# Patient Record
Sex: FEMALE | Race: BLACK OR AFRICAN AMERICAN | NOT HISPANIC OR LATINO | Employment: FULL TIME | URBAN - METROPOLITAN AREA
[De-identification: names, ages, dates, MRNs, and addresses within clinical notes are randomized per-mention and may not be internally consistent; named-entity substitution may affect disease eponyms.]

---

## 2023-05-12 ENCOUNTER — APPOINTMENT (OUTPATIENT)
Dept: LAB | Facility: LAB | Age: 43
End: 2023-05-12
Payer: MEDICAID

## 2023-12-18 ENCOUNTER — APPOINTMENT (OUTPATIENT)
Dept: RADIOLOGY | Facility: HOSPITAL | Age: 43
End: 2023-12-18
Payer: COMMERCIAL

## 2023-12-18 ENCOUNTER — HOSPITAL ENCOUNTER (EMERGENCY)
Facility: HOSPITAL | Age: 43
Discharge: HOME | End: 2023-12-18
Attending: EMERGENCY MEDICINE
Payer: COMMERCIAL

## 2023-12-18 VITALS
OXYGEN SATURATION: 98 % | HEART RATE: 94 BPM | HEIGHT: 66 IN | TEMPERATURE: 97.5 F | BODY MASS INDEX: 39.37 KG/M2 | SYSTOLIC BLOOD PRESSURE: 134 MMHG | WEIGHT: 245 LBS | DIASTOLIC BLOOD PRESSURE: 87 MMHG | RESPIRATION RATE: 20 BRPM

## 2023-12-18 DIAGNOSIS — S82.839A AVULSION FRACTURE OF DISTAL FIBULA: Primary | ICD-10-CM

## 2023-12-18 PROCEDURE — 73610 X-RAY EXAM OF ANKLE: CPT | Mod: RIGHT SIDE | Performed by: RADIOLOGY

## 2023-12-18 PROCEDURE — 99284 EMERGENCY DEPT VISIT MOD MDM: CPT | Performed by: EMERGENCY MEDICINE

## 2023-12-18 PROCEDURE — 73110 X-RAY EXAM OF WRIST: CPT | Mod: RIGHT SIDE | Performed by: RADIOLOGY

## 2023-12-18 PROCEDURE — 73630 X-RAY EXAM OF FOOT: CPT | Mod: RT

## 2023-12-18 PROCEDURE — 73564 X-RAY EXAM KNEE 4 OR MORE: CPT | Mod: 50

## 2023-12-18 PROCEDURE — 2500000001 HC RX 250 WO HCPCS SELF ADMINISTERED DRUGS (ALT 637 FOR MEDICARE OP): Mod: SE

## 2023-12-18 PROCEDURE — 73502 X-RAY EXAM HIP UNI 2-3 VIEWS: CPT | Mod: RT

## 2023-12-18 PROCEDURE — 73610 X-RAY EXAM OF ANKLE: CPT | Mod: RT,FR

## 2023-12-18 PROCEDURE — 73502 X-RAY EXAM HIP UNI 2-3 VIEWS: CPT | Mod: RIGHT SIDE | Performed by: RADIOLOGY

## 2023-12-18 PROCEDURE — 73564 X-RAY EXAM KNEE 4 OR MORE: CPT | Mod: BILATERAL PROCEDURE | Performed by: RADIOLOGY

## 2023-12-18 PROCEDURE — 73630 X-RAY EXAM OF FOOT: CPT | Mod: RIGHT SIDE | Performed by: RADIOLOGY

## 2023-12-18 PROCEDURE — 73110 X-RAY EXAM OF WRIST: CPT | Mod: RT,FY,FR

## 2023-12-18 RX ORDER — IBUPROFEN 600 MG/1
600 TABLET ORAL ONCE
Status: COMPLETED | OUTPATIENT
Start: 2023-12-18 | End: 2023-12-18

## 2023-12-18 RX ADMIN — IBUPROFEN 600 MG: 600 TABLET, FILM COATED ORAL at 16:35

## 2023-12-18 ASSESSMENT — PAIN - FUNCTIONAL ASSESSMENT
PAIN_FUNCTIONAL_ASSESSMENT: 0-10
PAIN_FUNCTIONAL_ASSESSMENT: 0-10

## 2023-12-18 ASSESSMENT — PAIN SCALES - GENERAL
PAINLEVEL_OUTOF10: 9
PAINLEVEL_OUTOF10: 0 - NO PAIN

## 2023-12-18 ASSESSMENT — COLUMBIA-SUICIDE SEVERITY RATING SCALE - C-SSRS
6. HAVE YOU EVER DONE ANYTHING, STARTED TO DO ANYTHING, OR PREPARED TO DO ANYTHING TO END YOUR LIFE?: NO
1. IN THE PAST MONTH, HAVE YOU WISHED YOU WERE DEAD OR WISHED YOU COULD GO TO SLEEP AND NOT WAKE UP?: NO
2. HAVE YOU ACTUALLY HAD ANY THOUGHTS OF KILLING YOURSELF?: NO

## 2023-12-18 ASSESSMENT — PAIN DESCRIPTION - ORIENTATION: ORIENTATION: RIGHT

## 2023-12-18 ASSESSMENT — PAIN DESCRIPTION - PAIN TYPE: TYPE: ACUTE PAIN

## 2023-12-18 ASSESSMENT — PAIN DESCRIPTION - LOCATION: LOCATION: ANKLE

## 2023-12-18 ASSESSMENT — LIFESTYLE VARIABLES
EVER FELT BAD OR GUILTY ABOUT YOUR DRINKING: NO
HAVE YOU EVER FELT YOU SHOULD CUT DOWN ON YOUR DRINKING: NO
HAVE PEOPLE ANNOYED YOU BY CRITICIZING YOUR DRINKING: NO
EVER HAD A DRINK FIRST THING IN THE MORNING TO STEADY YOUR NERVES TO GET RID OF A HANGOVER: NO
REASON UNABLE TO ASSESS: NO

## 2023-12-18 NOTE — ED PROVIDER NOTES
CC: Fall     HPI:  This patient is a 43-year-old female who presents emergency department after a fall.  Patient states that she was walking outside over a median in between parking spaces that was wet from the rain and muddy.  She ended up slipping as she went to step down off of the median.  She fell forward landing on both of her knees and caught the rest of her fall with her right hand.  She tried to tough it out throughout the day however has had a lot of pain in her right ankle and has been unable to bear a ton of weight.  She did wrap it with Coban and at work with little relief.  She is complaining of pain in both of her knees with weightbearing and ambulation.  She also has pain in her right hip.  She complained of pain in her right wrist upon lifting objects.  She is right-handed.  She did not take any medications because she has a history of migraines and was fearful of the rebound headaches.  She presents to be checked out for broken bones.  No other symptoms at this time.  Patient does have a history of prior right foot surgeries including bunionectomy and shortening of the second toe.    Limitations to history: None  Independent historian(s): Patient  Records Reviewed: Recent available ED and inpatient notes reviewed in EMR.    PMHx/PSHx:  Per HPI.   - has no past medical history on file.  - has a past surgical history that includes Tubal ligation (2015);  section, classic (2015); Hysteroscopy (2016); and Other surgical history (2021).    Medications:  Reviewed in EMR. See EMR for complete list of medications and doses.    Allergies:  Carisoprodol and Sulfa (sulfonamide antibiotics)    Social History:  - Tobacco:  has no history on file for tobacco use.   - Alcohol:  has no history on file for alcohol use.   - Illicit Drugs:  has no history on file for drug use.     ROS:  Per HPI.       ???????????????????????????????????????????????????????????????  Triage Vitals:  T  36.4 °C (97.5 °F)  HR 96  BP (!) 137/91  RR 16  O2 100 %      Physical Exam    General: Patient resting comfortably, no acute distress, overall well appearing, and appropriately conversational.   Head: Normocephalic. Atraumatic.  Neck: FROM. No gross masses.   Eyes: EOMI. Conjunctiva clear.   ENT: Moist mucous membranes, no apparent trauma or lesions.  CV: Regular rate. 2+ radial pulses bilaterally.  Resp: No respiratory distress, breathing easily. Speaks in full sentences.    GI: Soft, non-distended. No tenderness with palpation.   MSK: Tenderness with palpation of the medial ankle just below the ankle as well as medial midfoot.  Good range of motion in the right ankle.  Tenderness in bilateral patella and area just distal to patella.  Full range of motion with some tenderness.  No tenderness to palpation of the right hip however pain with range of motion.  No snuffbox tenderness of the right wrist.  Good range of motion without significant tenderness.  EXT: No peripheral edema, contusions, or wounds.   Skin: Warm and dry, no rashes or lesions.  Neuro: Alert. No focal neurological deficits. Motor and sensation intact throughout. Speech fluent. Normal gait.   Psych: Appropriate mood and behavior, converses and responds appropriately.    ???????????????????????????????????????????????????????????????  EKG:  None    MDM:  This patient is a 43-year-old female who presents emergency department after a fall.  Patient has pain in multiple joints and regions.  Blood pressure mildly elevated 137/91 however other vitals are normal.  There is no snuffbox tenderness in the right wrist and my concern for scaphoid fracture is low based on this and her physical exam.  Will obtain x-ray imaging of her right wrist, right hip and pelvis, bilateral knees, and right ankle and foot checking for any fractures.  Overall my concern for fractures in the hip and knees is quite low based on her physical exam.  Patient did wish to receive  some pain medication and requested Motrin.  She denies being pregnant.    X-ray imaging revealed indication of an avulsion fracture of the right distal fibula.  X-ray of the right hand showing possible subluxation at the basal joint of the right thumb.  Patient states she started having some pain when they are manipulating her hand for the x-rays.  All other x-rays were negative for fractures or acute traumatic injuries.  Patient will be provided with a walking boot and crutches for the fracture.  We will also give her a soft wrist brace given the findings in the right wrist.  I recommended follow-up with orthopedic surgery in 1 week.  Information provided for orthopedic walk-in clinics as well.  She expressed understanding and agreed with this plan.  She remained hemodynamically stable and will be discharged home.    ED Course:  ED Course as of 12/18/23 1940   Mon Dec 18, 2023   1857 Right distal fibula avulsion fracture. Subluxation of the basal joint of right thumb, likely chronic.  [VM]      ED Course User Index  [VM] Marco A Quintero DO         Diagnoses as of 12/18/23 1940   Avulsion fracture of distal fibula       Social Determinants Limiting Care:  None identified    Disposition:  Discharged       Marco A Quintero DO  Emergency Medicine PGY-2  [facility]      Procedures ? SmartLinks last updated 12/18/2023 6:58 PM        Marco A Quintero DO  Resident  12/18/23 1941

## 2023-12-19 ENCOUNTER — OFFICE VISIT (OUTPATIENT)
Dept: ORTHOPEDIC SURGERY | Facility: HOSPITAL | Age: 43
End: 2023-12-19
Payer: COMMERCIAL

## 2023-12-19 DIAGNOSIS — M21.6X1 ACQUIRED PRONATION DEFORMITY OF ANKLE, RIGHT: ICD-10-CM

## 2023-12-19 DIAGNOSIS — M76.821 POSTERIOR TIBIAL TENDON DYSFUNCTION (PTTD) OF RIGHT LOWER EXTREMITY: ICD-10-CM

## 2023-12-19 DIAGNOSIS — S82.51XA AVULSION FRACTURE OF MEDIAL MALLEOLUS OF RIGHT TIBIA, CLOSED, INITIAL ENCOUNTER: Primary | ICD-10-CM

## 2023-12-19 DIAGNOSIS — M67.01 CONTRACTURE OF RIGHT ACHILLES TENDON: ICD-10-CM

## 2023-12-19 DIAGNOSIS — M21.41 ACQUIRED PES PLANUS, RIGHT: ICD-10-CM

## 2023-12-19 PROCEDURE — G0463 HOSPITAL OUTPT CLINIC VISIT: HCPCS | Performed by: STUDENT IN AN ORGANIZED HEALTH CARE EDUCATION/TRAINING PROGRAM

## 2023-12-19 PROCEDURE — 99213 OFFICE O/P EST LOW 20 MIN: CPT | Performed by: STUDENT IN AN ORGANIZED HEALTH CARE EDUCATION/TRAINING PROGRAM

## 2023-12-19 PROCEDURE — 99203 OFFICE O/P NEW LOW 30 MIN: CPT | Performed by: STUDENT IN AN ORGANIZED HEALTH CARE EDUCATION/TRAINING PROGRAM

## 2023-12-19 RX ORDER — VALSARTAN 160 MG/1
160 TABLET ORAL
COMMUNITY
Start: 2023-12-07

## 2023-12-19 RX ORDER — IBUPROFEN 600 MG/1
800 TABLET ORAL EVERY 6 HOURS
COMMUNITY
Start: 2013-09-26

## 2023-12-19 RX ORDER — ATORVASTATIN CALCIUM 20 MG/1
20 TABLET, FILM COATED ORAL DAILY
COMMUNITY

## 2023-12-19 RX ORDER — FAMOTIDINE 20 MG/1
20 TABLET, FILM COATED ORAL 2 TIMES DAILY
COMMUNITY
Start: 2019-01-07

## 2023-12-19 RX ORDER — OMEPRAZOLE 40 MG/1
1 CAPSULE, DELAYED RELEASE ORAL
COMMUNITY
Start: 2019-11-04

## 2023-12-19 RX ORDER — DICLOFENAC SODIUM 10 MG/G
4 GEL TOPICAL 4 TIMES DAILY PRN
Qty: 450 G | Refills: 0 | Status: SHIPPED | OUTPATIENT
Start: 2023-12-19 | End: 2024-02-17

## 2023-12-19 RX ORDER — TOPIRAMATE 50 MG/1
TABLET, FILM COATED ORAL
COMMUNITY
Start: 2023-12-15

## 2023-12-19 RX ORDER — CHLORTHALIDONE 25 MG/1
25 TABLET ORAL DAILY
COMMUNITY

## 2023-12-19 RX ORDER — AMLODIPINE BESYLATE 10 MG/1
10 TABLET ORAL EVERY EVENING
COMMUNITY

## 2023-12-19 RX ORDER — ALBUTEROL SULFATE 90 UG/1
AEROSOL, METERED RESPIRATORY (INHALATION)
COMMUNITY

## 2023-12-19 ASSESSMENT — PAIN - FUNCTIONAL ASSESSMENT: PAIN_FUNCTIONAL_ASSESSMENT: 0-10

## 2023-12-19 ASSESSMENT — PAIN SCALES - GENERAL: PAINLEVEL_OUTOF10: 7

## 2023-12-19 ASSESSMENT — PAIN DESCRIPTION - DESCRIPTORS: DESCRIPTORS: ACHING;STABBING;SORE

## 2023-12-19 NOTE — PROGRESS NOTES
ORTHOPAEDIC SURGERY HISTORY & PHYSICAL     Chief Complaint:  Right ankle pain    History Of Present Illness  Pia Mooney is a 43 y.o. female who presents for evaluation of right ankle pain.  Patient sustained a fall yesterday sustained a twisting injury to her ankle.  She has had severe, 7 out of 10 pain.  She was seen in the ER where x-ray imaging was obtained that was concerning for fracture and was made protected weightbearing with crutches and a boot.  She has been unable to stand on her foot.  She has not had an injury like this in the past.  Patient underwent by right bunion correction with DMTO (sis) and 2nd DMTO with weil with Dr. Viramontes from [facility] in 02/2023 by review of care everywhere.  Patient reports persistent pain in that her toes are deviating to the right.  Patient has been ambulating with an avoidance gait to avoid pressure on the inside of her foot.  She reports that she missed her last follow-up and has not rescheduled to be seen.     Past Medical History  No past medical history on file.    Surgical History  Recent Surgeries in Orthopaedic Surgery            No cases to display             Social History  Social History     Socioeconomic History    Marital status:      Spouse name: Not on file    Number of children: Not on file    Years of education: Not on file    Highest education level: Not on file   Occupational History    Not on file   Tobacco Use    Smoking status: Not on file    Smokeless tobacco: Not on file   Substance and Sexual Activity    Alcohol use: Not on file    Drug use: Not on file    Sexual activity: Not on file   Other Topics Concern    Not on file   Social History Narrative    Not on file     Social Determinants of Health     Financial Resource Strain: Not on file   Food Insecurity: Not on file   Transportation Needs: Not on file   Physical Activity: Not on file   Stress: Not on file   Social Connections: Not on file   Intimate Partner  Violence: Not on file   Housing Stability: Not on file       Family History  No family history on file.     Allergies  Allergies   Allergen Reactions    Carisoprodol Shortness of breath     Dizziness, passed out    Sulfa (Sulfonamide Antibiotics) Hives       Review of Systems  REVIEW OF SYSTEMS  Constitutional: no unplanned weight loss  Psychiatric: no suicidal ideation  ENT: no vision changes, no sinus problems  Pulmonary: no shortness of breath during office visit today  Lymphatic: no enlarged lymph nodes  Cardiovascular: no chest pain or shortness of breath during office visit today  Gastrointestinal: no stomach problems  Genitourinary: no dysuria   Skin: no rashes  Endocrine: no thyroid problems  Neurological: no headache, no numbness  Hematological: no easy bruising  Musculoskeletal: Right ankle pain    Physical Exam  PHYSICAL EXAMINATION  Constitutional Exam: well developed and well nourished  Psychiatric Exam: alert and oriented, appropriate mood and behavior  Eye Exam: EOMI  Pulmonary Exam: breathing non-labored, no apparent distress  Lymphatic exam: no appreciable lymphadenopathy in the lower extremities  Cardiovascular exam: RRR to peripheral palpation, DP pulses 2+, PT 2+, toes are pink with good capillary refill, no pitting edema  Skin exam: no open lesions, rashes, abrasions or ulcerations  Neurological exam: sensation to light touch intact in both lower extremities in peripheral and dermatomal distributions (except for any abnormalities noted in musculoskeletal exam)    Musculoskeletal exam: Right lower extremity examination.  Patient pain localized to the medial ankle both anterior and posterior to the medial malleolus.  There is bogginess about the PTT and tenderness to palpation at the medial malleolus.  There is no obvious ankle effusion.  Patient has well-healed medial bunion and first webspace incisions with atrophic appearing scar with tenderness to palpation overlying the first metatarsal  phalangeal joint medially as well as dorsally overlying the second metatarsal head.  Patient has pain-free and supple ankle ROM but lacks approximately 20 degrees of dorsiflexion that does not improve with knee flexion.  Patient has pain-free and supple midtarsal and subtalar joint range of motion.  Patient has sensation intact light touch grossly in the saphenous, sural, superficial peroneal, deep peroneal and tibial nerve attribution.  She has 5 out of 5 strength in plantarflexion, dorsiflexion and EHL she has pain limited inversion, 4-5 and 5 out of 5 strength in eversion.  Patient has 2+ DP/PT pulse palpated.    Last Recorded Vitals  There were no vitals taken for this visit.    Laboratory Results    No results found for this or any previous visit (from the past 24 hour(s)).    Radiology Results   X-ray imaging 3 view nonweightbearing right ankle reviewed from 12/18/2023 and independently evaluated by me on 12/19/2023 demonstrates well-corticated distal fibular avulsion fracture just distal to the tip laterally and subtle medial malleolar distal avulsion injury concerning for deltoid equivalent injury.    Assessment/Plan:  43-year-old female who my impression has right medial malleoli are avulsion type injury in setting of progressively collapsing foot deformity with features of pes planus, acquired pronation deformity, PTTD and Achilles contracture.  I have reviewed the diagnosis and treatment options extensively with the patient.  In my impression, the patient may continue weightbearing as tolerated in her right lower extremity.  I will provide her with information regarding a lace up style ankle brace for increased support.  I will provide her with a prescription for a topical anti-inflammatory for use directly over the painful area and formally refer her to physical therapy to work on ankle range of motion, strengthening, proprioceptive training as well as PTT strengthening.  I will provide her with a work  note for light duty.  I will plan to see the patient back in approximately 3 weeks for repeat clinical evaluation.  Anticipate discussion regarding brace weaning at that time.  Upon return, patient would not require further imaging.    Rolando No MD, ARI  Department of Orthopaedic Surgery  [facility]    The diagnosis and treatment plan were reviewed with the patient. All questions were answered. The patient verbalized understanding of the treatment plan. There were no barriers to understanding identified.    Note dictated with [employer] software.  Completed without full type editing and sent to avoid delay.

## 2023-12-19 NOTE — DISCHARGE INSTRUCTIONS
Please follow-up with orthopedic surgery for your hand and ankle in approximately 1 week.  Referral ordered however there number is [phone] if you need to call to schedule an appointment.  Return to the emergency department for any new or worsening symptoms or for any other concerns.

## 2023-12-19 NOTE — LETTER
December 19, 2023     Patient: Pia Mooney   YOB: 1980   Date of Visit: 12/19/2023       To Whom It May Concern:    It is my medical opinion that Pia Mooney may return to light duty immediately with the following restrictions: light duty/ desk work/phone work ONLY for the next 3 weeks due to new right foot/ankle fracture . She will follow up in 3 weeks with Dr No and restrictions will be re-evaluated at that time.     If you have any questions or concerns, please don't hesitate to call.         Sincerely,        Rolando No MD    CC: No Recipients

## 2023-12-26 ENCOUNTER — ANCILLARY PROCEDURE (OUTPATIENT)
Dept: RADIOLOGY | Facility: CLINIC | Age: 43
End: 2023-12-26
Payer: MEDICAID

## 2023-12-26 ENCOUNTER — OFFICE VISIT (OUTPATIENT)
Dept: ORTHOPEDIC SURGERY | Facility: CLINIC | Age: 43
End: 2023-12-26
Payer: COMMERCIAL

## 2023-12-26 DIAGNOSIS — M25.531 WRIST PAIN, ACUTE, RIGHT: ICD-10-CM

## 2023-12-26 DIAGNOSIS — M25.531 ACUTE PAIN OF BOTH WRISTS: ICD-10-CM

## 2023-12-26 DIAGNOSIS — M25.532 ACUTE PAIN OF BOTH WRISTS: ICD-10-CM

## 2023-12-26 DIAGNOSIS — M25.531 WRIST PAIN, ACUTE, RIGHT: Primary | ICD-10-CM

## 2023-12-26 PROCEDURE — 99214 OFFICE O/P EST MOD 30 MIN: CPT | Performed by: STUDENT IN AN ORGANIZED HEALTH CARE EDUCATION/TRAINING PROGRAM

## 2023-12-26 PROCEDURE — 73110 X-RAY EXAM OF WRIST: CPT | Mod: LEFT SIDE | Performed by: RADIOLOGY

## 2023-12-26 PROCEDURE — 73110 X-RAY EXAM OF WRIST: CPT | Mod: LT

## 2023-12-26 PROCEDURE — 73110 X-RAY EXAM OF WRIST: CPT | Mod: RT

## 2023-12-26 ASSESSMENT — PAIN DESCRIPTION - DESCRIPTORS: DESCRIPTORS: ACHING

## 2023-12-26 ASSESSMENT — PAIN SCALES - GENERAL: PAINLEVEL_OUTOF10: 7

## 2023-12-26 ASSESSMENT — PAIN - FUNCTIONAL ASSESSMENT: PAIN_FUNCTIONAL_ASSESSMENT: 0-10

## 2023-12-26 NOTE — PROGRESS NOTES
History of Present Illness   Patient presents today for evaluation of side: bilateral upper extremity pain.  She has mild pain on the left and more significant pain on the right.  The patient sustained an acute injury on  12/18/2023 .  She sustained a ground-level fall.  She was seen in the emergency room and given a cock up wrist brace.  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  The patient denies any loss of consciousness.  She also injured her right lower extremity..  The patient denies any current numbness or tingling.  The pain is sharp, acute in nature, better with rest worse with motion.     No past medical history on file.    Medication Documentation Review Audit       Reviewed by Nan López MA (Medical Assistant) on 12/26/23 at 1442      Medication Order Taking? Sig Documenting Provider Last Dose Status   albuterol 90 mcg/actuation inhaler [id number] No 1 TO 2 INHALATIONS INHALATION EVERY 4 TO 6 HOURS AS NEEDED. Historical Provider, MD Taking Active   amLODIPine (Norvasc) 10 mg tablet [id number] No Take 1 tablet (10 mg) by mouth once daily in the evening. Historical Provider, MD Taking Active   atorvastatin (Lipitor) 20 mg tablet [id number] No Take 1 tablet (20 mg) by mouth once daily. Historical Provider, MD Taking Active   chlorthalidone (Hygroton) 25 mg tablet [id number] No Take 1 tablet (25 mg) by mouth once daily. Historical Provider, MD Taking Active   diclofenac sodium (Voltaren) 1 % gel gel [id number]  Apply 1 Application topically 4 times a day as needed (pain). Rolando No MD  Active   famotidine (Pepcid) 20 mg tablet [id number] No Take 1 tablet (20 mg) by mouth twice a day. Historical Provider, MD Taking Active   ibuprofen 600 mg tablet [id number] No Take 800 mg by mouth every 6 hours. Historical Provider, MD Taking Active   omeprazole (PriLOSEC) 40 mg DR capsule [id number] No Take 1 capsule (40 mg) by mouth once daily in the morning. Take before meals. Historical Provider  MD Taking Active   topiramate (Topamax) 50 mg tablet [id number] No 50 mg (1 pill) AM and 100 mg (2 pills) PM Historical Provider, MD Taking Active   valsartan (Diovan) 160 mg tablet [id number] No Take 1 tablet (160 mg) by mouth once daily. Historical Provider, MD Taking Active                    Allergies   Allergen Reactions    Carisoprodol Shortness of breath     Dizziness, passed out    Sulfa (Sulfonamide Antibiotics) Hives       Social History     Socioeconomic History    Marital status:      Spouse name: Not on file    Number of children: Not on file    Years of education: Not on file    Highest education level: Not on file   Occupational History    Not on file   Tobacco Use    Smoking status: Not on file    Smokeless tobacco: Not on file   Substance and Sexual Activity    Alcohol use: Not on file    Drug use: Not on file    Sexual activity: Not on file   Other Topics Concern    Not on file   Social History Narrative    Not on file     Social Determinants of Health     Financial Resource Strain: Not on file   Food Insecurity: Not on file   Transportation Needs: Not on file   Physical Activity: Not on file   Stress: Not on file   Social Connections: Not on file   Intimate Partner Violence: Not on file   Housing Stability: Not on file       Past Surgical History:   Procedure Laterality Date     SECTION, CLASSIC  2015     Section    HYSTEROSCOPY  2016    Hysteroscopy With Endometrial Ablation    OTHER SURGICAL HISTORY  2021    Arm surgery    TUBAL LIGATION  2015    Tubal Ligation          Review of Systems   GENERAL: Negative  GI: Negative  MUSCULOSKELETAL: See HPI  SKIN: Negative  NEURO:  Negative     Physical Exam:  side: bilateral upper extremity:  Skin healthy to gross inspection, no breakdown  Mild swelling over the right wrist.  No swelling to the left wrist.  No ecchymosis noted bilaterally.  Mild tenderness to palpation over the SL interval.  Negative Phelan  shift test.  Mildly tender over the anatomic snuffbox.  Intact flexion and extension of 1st IP joint and finger abduction  Sensation intact to light touch medial / ulnar and radial nerve distribution   Good cap refill     Imaging  X-rays of bilateral wrist were taken and reviewed in office today.  These were compared to right wrist x-rays dated 12/18/2023  5 views of both wrist which include a PA, lateral, oblique, scaphoid view and a pencil  test demonstrate no fracture dislocation there is mild diastases of the SL interval bilaterally at slightly over 3 mm.     Assessment   Patient with an acute side: bilateral wrist sprain.     Plan:  Patient continues to have right wrist pain.  We discussed continuing with a Velcro removable splint versus putting into a cast for a period of time.  She elected to proceed with a cast to the right wrist.  We will follow her up in 2 weeks remove cast and repeat x-rays.  She does have diastases of her SL intervals however this is bilateral and may be her normal anatomy.  Follow-up 2 weeks with repeat x-rays and repeat clinical exam.

## 2024-01-09 ENCOUNTER — OFFICE VISIT (OUTPATIENT)
Dept: ORTHOPEDIC SURGERY | Facility: CLINIC | Age: 44
End: 2024-01-09
Payer: COMMERCIAL

## 2024-01-09 DIAGNOSIS — S82.51XA AVULSION FRACTURE OF MEDIAL MALLEOLUS OF RIGHT TIBIA, CLOSED, INITIAL ENCOUNTER: Primary | ICD-10-CM

## 2024-01-09 DIAGNOSIS — M21.42 ACQUIRED PES PLANUS, LEFT: ICD-10-CM

## 2024-01-09 DIAGNOSIS — M21.41 ACQUIRED PES PLANUS, RIGHT: ICD-10-CM

## 2024-01-09 PROCEDURE — 99212 OFFICE O/P EST SF 10 MIN: CPT | Performed by: STUDENT IN AN ORGANIZED HEALTH CARE EDUCATION/TRAINING PROGRAM

## 2024-01-09 PROCEDURE — G0463 HOSPITAL OUTPT CLINIC VISIT: HCPCS | Performed by: STUDENT IN AN ORGANIZED HEALTH CARE EDUCATION/TRAINING PROGRAM

## 2024-01-09 ASSESSMENT — PAIN - FUNCTIONAL ASSESSMENT: PAIN_FUNCTIONAL_ASSESSMENT: 0-10

## 2024-01-09 ASSESSMENT — PAIN SCALES - GENERAL: PAINLEVEL_OUTOF10: 6

## 2024-01-09 ASSESSMENT — PAIN DESCRIPTION - DESCRIPTORS: DESCRIPTORS: ACHING

## 2024-01-09 NOTE — LETTER
January 9, 2024     Patient: Pia Mooney   YOB: 1980   Date of Visit: 1/9/2024       To Whom It May Concern:    It is my medical opinion that Pia Mooney may return to light duty immediately with the following restrictions: minimal weight-bearing throughout the day. Restrictions to be re-evaluated at next visit on 2-8-2024 .    If you have any questions or concerns, please don't hesitate to call.         Sincerely,        Rolando No MD    CC: No Recipients

## 2024-01-09 NOTE — PROGRESS NOTES
ORTHOPAEDIC SURGERY OFFICE VISIT    Chief Complaint:  Right ankle pain    History Of Present Illness  Pia Mooney is a 43 y.o. female who presents for evaluation of right ankle pain.  Patient sustained a fall yesterday sustained a twisting injury to her ankle.  She has had severe, 7 out of 10 pain.  She was seen in the ER where x-ray imaging was obtained that was concerning for fracture and was made protected weightbearing with crutches and a boot.  She has been unable to stand on her foot.  She has not had an injury like this in the past.  Patient underwent by right bunion correction with DMTO (sis) and 2nd DMTO with weil with Dr. Viramontes from [facility] in 02/2023 by review of care everywhere.  Patient reports persistent pain in that her toes are deviating to the right.  Patient has been ambulating with an avoidance gait to avoid pressure on the inside of her foot.  She reports that she missed her last follow-up and has not rescheduled to be seen.     01/09/2024: Patient returns for follow-up of her right ankle pain.  Patient continues with 6 out of 10 pain.  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  Patient has not yet scheduled her physical therapy.  She has been wearing a brace at home.  Patient reports stiffness in her toes that is distinct from the contralateral side.  Patient reports she has had bilateral flatfeet for as long as she can remember without significant progression of her deformity.    Past Medical History  History reviewed. No pertinent past medical history.    Surgical History  Recent Surgeries in Orthopaedic Surgery            No cases to display             Social History  Social History     Socioeconomic History    Marital status:      Spouse name: None    Number of children: None    Years of education: None    Highest education level: None   Occupational History    None   Tobacco Use    Smoking status: None    Smokeless tobacco: None   Substance and  Sexual Activity    Alcohol use: None    Drug use: None    Sexual activity: None   Other Topics Concern    None   Social History Narrative    None     Social Determinants of Health     Financial Resource Strain: Not on file   Food Insecurity: Not on file   Transportation Needs: Not on file   Physical Activity: Not on file   Stress: Not on file   Social Connections: Not on file   Intimate Partner Violence: Not on file   Housing Stability: Not on file       Family History  No family history on file.     Allergies  Allergies   Allergen Reactions    Carisoprodol Shortness of breath     Dizziness, passed out    Sulfa (Sulfonamide Antibiotics) Hives       Review of Systems  REVIEW OF SYSTEMS  Constitutional: no unplanned weight loss  Psychiatric: no suicidal ideation  ENT: no vision changes, no sinus problems  Pulmonary: no shortness of breath during office visit today  Lymphatic: no enlarged lymph nodes  Cardiovascular: no chest pain or shortness of breath during office visit today  Gastrointestinal: no stomach problems  Genitourinary: no dysuria   Skin: no rashes  Endocrine: no thyroid problems  Neurological: no headache, no numbness  Hematological: no easy bruising  Musculoskeletal: Right ankle pain    Physical Exam  PHYSICAL EXAMINATION  Constitutional Exam: well developed and well nourished  Psychiatric Exam: alert and oriented, appropriate mood and behavior  Eye Exam: EOMI  Pulmonary Exam: breathing non-labored, no apparent distress  Lymphatic exam: no appreciable lymphadenopathy in the lower extremities  Cardiovascular exam: RRR to peripheral palpation, DP pulses 2+, PT 2+, toes are pink with good capillary refill, no pitting edema  Skin exam: no open lesions, rashes, abrasions or ulcerations  Neurological exam: sensation to light touch intact in both lower extremities in peripheral and dermatomal distributions (except for any abnormalities noted in musculoskeletal exam)    Musculoskeletal exam:  Right lower  extremity examination.  Patient pain continues to localize to the retromalleolar region, she is focally tender to palpation overlying the PTT with obvious decrease in her tendon swelling.  There is no obvious ankle effusion.  Patient has well-healed but atrophic bunion and first webspace incisions.  Patient has pain-free and supple ankle ROM lacking approximately 20 degrees of ankle dorsiflexion that does not improve with knee flexion.  Patient has greater than physiologic hindfoot valgus with pes planus arch posture and no significant forefoot deformity noted.  Patient has pain-free and supple midtarsal and subtalar joint range of motion.  Patient has sensation intact light touch grossly in the saphenous, sural, superficial peroneal, deep peroneal and tibial nerve attribution.  She has 5 out of 5 strength in plantarflexion, dorsiflexion and EHL she has pain limited inversion, 4-5 and 5 out of 5 strength in eversion.  Patient has 2+ DP/PT pulse palpated.    Left lower extremity examination.  Patient without localizable pain but greater than physiologic hindfoot valgus with pes planus arch posture and no significant forefoot deformity.  Patient has pain-free foot and ankle ROM.  Sensation is intact to light touch grossly in the distal extremity.  Extremities warm and well-perfused.    Last Recorded Vitals  There were no vitals taken for this visit.    Laboratory Results    No results found for this or any previous visit (from the past 24 hour(s)).    Radiology Results   No new imaging at this visit.    Assessment/Plan:  43-year-old female who my impression has right medial malleoli are avulsion type injury in setting of progressively collapsing foot deformity with features of pes planus, acquired pronation deformity, PTTD and Achilles contracture as well as left pes planus.  I have reviewed the diagnosis and treatment options extensively with the patient.  In my impression the patient may continue weightbearing as  tolerated in her right lower extremity, I have encouraged her to continue utilizing her lace up style ankle brace and to schedule physical therapy.  She may continue with topical anti and inflammatory use.  I will provide an extension of her light duty so that she may begin PT. I will provide the patient with a prescription for a custom fabricated orthotic. I will plan to see the patient back in approximately 3 weeks for repeat clinical evaluation.  I anticipate transitioning the patient to full duty at that time.  Upon return, patient would not require further imaging.    Please call and schedule an appointment to get fitted or molded for your custom made orthotics (see your prescription for phone number).  You need to bring your prescription with you to your appointment.  Insurance coverage for orthotics varies widely and you can contact your insurance company to find out whether orthotics are covered or not with your plan.  The orthotics company also can give you a cost estimate.     Once your custom made orthotics are made and ready to use, then take out your shoe's insert that it came with and put the orthotic in your shoe.  Orthotics generally work better in lace-up types of shoes (athletic shoes, clarks, keans, merrells, sketchers, etc.).  Sometimes, to accommodate your orthotic, you may have to wear a ½ to one size bigger shoe.  Your orthotics can be adjusted for comfort.  Your pedorthotist can help with adjustments after you wear your orthotics for an initial period of time.    Rolando No MD, ARI  Department of Orthopaedic Surgery  [facility]    The diagnosis and treatment plan were reviewed with the patient. All questions were answered. The patient verbalized understanding of the treatment plan. There were no barriers to understanding identified.    Note dictated with [employer] software.  Completed without full type editing and sent to avoid  delay.

## 2024-01-16 ENCOUNTER — ANCILLARY PROCEDURE (OUTPATIENT)
Dept: RADIOLOGY | Facility: CLINIC | Age: 44
End: 2024-01-16
Payer: MEDICAID

## 2024-01-16 ENCOUNTER — OFFICE VISIT (OUTPATIENT)
Dept: ORTHOPEDIC SURGERY | Facility: CLINIC | Age: 44
End: 2024-01-16
Payer: COMMERCIAL

## 2024-01-16 DIAGNOSIS — S69.91XA WRIST INJURY, RIGHT, INITIAL ENCOUNTER: Primary | ICD-10-CM

## 2024-01-16 DIAGNOSIS — S69.91XA WRIST INJURY, RIGHT, INITIAL ENCOUNTER: ICD-10-CM

## 2024-01-16 DIAGNOSIS — S69.92XA WRIST INJURY, LEFT, INITIAL ENCOUNTER: ICD-10-CM

## 2024-01-16 PROCEDURE — 73110 X-RAY EXAM OF WRIST: CPT | Mod: RIGHT SIDE | Performed by: RADIOLOGY

## 2024-01-16 PROCEDURE — 73110 X-RAY EXAM OF WRIST: CPT | Mod: LEFT SIDE | Performed by: RADIOLOGY

## 2024-01-16 PROCEDURE — 73110 X-RAY EXAM OF WRIST: CPT | Mod: LT

## 2024-01-16 PROCEDURE — 73110 X-RAY EXAM OF WRIST: CPT | Mod: RT

## 2024-01-16 PROCEDURE — 99213 OFFICE O/P EST LOW 20 MIN: CPT | Performed by: STUDENT IN AN ORGANIZED HEALTH CARE EDUCATION/TRAINING PROGRAM

## 2024-01-16 ASSESSMENT — PAIN SCALES - GENERAL: PAINLEVEL_OUTOF10: 6

## 2024-01-16 ASSESSMENT — PAIN - FUNCTIONAL ASSESSMENT: PAIN_FUNCTIONAL_ASSESSMENT: 0-10

## 2024-01-16 ASSESSMENT — PAIN DESCRIPTION - DESCRIPTORS: DESCRIPTORS: ACHING

## 2024-01-16 NOTE — LETTER
January 16, 2024     Patient: Pia Mooney   YOB: 1980   Date of Visit: 1/16/2024       To Whom It May Concern:    Pia Mooney was seen in clinic with Dr. Tony Portillo today 1/16/2024.  She is restricted of use of her right hand, no pushing, pulling or lifting until revalued in four weeks.    If you have any questions or concerns, please don't hesitate to call.         Sincerely,        Tony Portillo, DO    CC: No Recipients

## 2024-01-30 ENCOUNTER — HOSPITAL ENCOUNTER (OUTPATIENT)
Dept: RADIOLOGY | Facility: CLINIC | Age: 44
Discharge: HOME | End: 2024-01-30
Payer: COMMERCIAL

## 2024-01-30 ENCOUNTER — APPOINTMENT (OUTPATIENT)
Dept: ORTHOPEDIC SURGERY | Facility: CLINIC | Age: 44
End: 2024-01-30
Payer: COMMERCIAL

## 2024-01-30 DIAGNOSIS — S69.91XA WRIST INJURY, RIGHT, INITIAL ENCOUNTER: ICD-10-CM

## 2024-01-30 DIAGNOSIS — S63.511A: ICD-10-CM

## 2024-01-30 DIAGNOSIS — S69.91XA INJURY OF RIGHT SCAPHOLUNATE LIGAMENT WITH NO INSTABILITY, INITIAL ENCOUNTER: ICD-10-CM

## 2024-01-30 DIAGNOSIS — S59.919A FOREARM INJURY, INITIAL ENCOUNTER: Primary | ICD-10-CM

## 2024-01-30 DIAGNOSIS — S59.919A FOREARM INJURY, INITIAL ENCOUNTER: ICD-10-CM

## 2024-01-30 PROCEDURE — 73110 X-RAY EXAM OF WRIST: CPT | Mod: RT

## 2024-01-30 PROCEDURE — 73110 X-RAY EXAM OF WRIST: CPT | Mod: RIGHT SIDE | Performed by: RADIOLOGY

## 2024-01-30 PROCEDURE — 73090 X-RAY EXAM OF FOREARM: CPT | Mod: RIGHT SIDE | Performed by: RADIOLOGY

## 2024-01-30 PROCEDURE — 73090 X-RAY EXAM OF FOREARM: CPT | Mod: RT

## 2024-01-30 RX ORDER — MELOXICAM 7.5 MG/1
7.5 TABLET ORAL DAILY
Qty: 30 TABLET | Refills: 1 | Status: SHIPPED | OUTPATIENT
Start: 2024-01-30 | End: 2024-03-30

## 2024-02-08 ENCOUNTER — OFFICE VISIT (OUTPATIENT)
Dept: ORTHOPEDIC SURGERY | Facility: CLINIC | Age: 44
End: 2024-02-08
Payer: COMMERCIAL

## 2024-02-08 DIAGNOSIS — M76.821 POSTERIOR TIBIAL TENDON DYSFUNCTION (PTTD) OF RIGHT LOWER EXTREMITY: Primary | ICD-10-CM

## 2024-02-08 DIAGNOSIS — S82.51XA AVULSION FRACTURE OF MEDIAL MALLEOLUS OF RIGHT TIBIA, CLOSED, INITIAL ENCOUNTER: ICD-10-CM

## 2024-02-08 PROCEDURE — 99212 OFFICE O/P EST SF 10 MIN: CPT | Performed by: STUDENT IN AN ORGANIZED HEALTH CARE EDUCATION/TRAINING PROGRAM

## 2024-02-08 PROCEDURE — G0463 HOSPITAL OUTPT CLINIC VISIT: HCPCS | Performed by: STUDENT IN AN ORGANIZED HEALTH CARE EDUCATION/TRAINING PROGRAM

## 2024-02-08 ASSESSMENT — PAIN - FUNCTIONAL ASSESSMENT: PAIN_FUNCTIONAL_ASSESSMENT: 0-10

## 2024-02-08 ASSESSMENT — PAIN SCALES - GENERAL: PAINLEVEL_OUTOF10: 4

## 2024-02-08 ASSESSMENT — PAIN DESCRIPTION - DESCRIPTORS: DESCRIPTORS: DULL;DISCOMFORT;ACHING

## 2024-02-08 NOTE — PROGRESS NOTES
ORTHOPAEDIC SURGERY OFFICE VISIT    Chief Complaint:  Right ankle pain    History Of Present Illness  Pia Mooeny is a 43 y.o. female who presents for evaluation of right ankle pain.  Patient sustained a fall yesterday sustained a twisting injury to her ankle.  She has had severe, 7 out of 10 pain.  She was seen in the ER where x-ray imaging was obtained that was concerning for fracture and was made protected weightbearing with crutches and a boot.  She has been unable to stand on her foot.  She has not had an injury like this in the past.  Patient underwent by right bunion correction with DMTO (sis) and 2nd DMTO with weil with Dr. Viramontes from [facility] in 02/2023 by review of care everywhere.  Patient reports persistent pain in that her toes are deviating to the right.  Patient has been ambulating with an avoidance gait to avoid pressure on the inside of her foot.  She reports that she missed her last follow-up and has not rescheduled to be seen.     01/09/2024: Patient returns for follow-up of her right ankle pain.  Patient continues with 6 out of 10 pain.  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  Patient has not yet scheduled her physical therapy.  She has been wearing a brace at home.  Patient reports stiffness in her toes that is distinct from the contralateral side.  Patient reports she has had bilateral flatfeet for as long as she can remember without significant progression of her deformity.    02/08/2024: Patient returns for follow-up of her right ankle pain.  She has 4 out of 10 pain and has continued in a light-duty capacity.  She has recently had physical therapy approved and is planning to begin therapy in the coming weeks.  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  Patient is only wearing her brace at home but not throughout the day.  She has not had orthotics made at this time.    Past Medical History  No past medical history on file.    Surgical  History  Recent Surgeries in Orthopaedic Surgery            No cases to display             Social History  Social History     Socioeconomic History    Marital status:      Spouse name: Not on file    Number of children: Not on file    Years of education: Not on file    Highest education level: Not on file   Occupational History    Not on file   Tobacco Use    Smoking status: Not on file    Smokeless tobacco: Not on file   Substance and Sexual Activity    Alcohol use: Not on file    Drug use: Not on file    Sexual activity: Not on file   Other Topics Concern    Not on file   Social History Narrative    Not on file     Social Determinants of Health     Financial Resource Strain: Not on file   Food Insecurity: Not on file   Transportation Needs: Not on file   Physical Activity: Not on file   Stress: Not on file   Social Connections: Not on file   Intimate Partner Violence: Not on file   Housing Stability: Not on file       Family History  No family history on file.     Allergies  Allergies   Allergen Reactions    Carisoprodol Shortness of breath     Dizziness, passed out    Sulfa (Sulfonamide Antibiotics) Hives       Review of Systems  REVIEW OF SYSTEMS  Constitutional: no unplanned weight loss  Psychiatric: no suicidal ideation  ENT: no vision changes, no sinus problems  Pulmonary: no shortness of breath during office visit today  Lymphatic: no enlarged lymph nodes  Cardiovascular: no chest pain or shortness of breath during office visit today  Gastrointestinal: no stomach problems  Genitourinary: no dysuria   Skin: no rashes  Endocrine: no thyroid problems  Neurological: no headache, no numbness  Hematological: no easy bruising  Musculoskeletal: Right ankle pain    Physical Exam  PHYSICAL EXAMINATION  Constitutional Exam: well developed and well nourished  Psychiatric Exam: alert and oriented, appropriate mood and behavior  Eye Exam: EOMI  Pulmonary Exam: breathing non-labored, no apparent distress  Lymphatic  exam: no appreciable lymphadenopathy in the lower extremities  Cardiovascular exam: RRR to peripheral palpation, DP pulses 2+, PT 2+, toes are pink with good capillary refill, no pitting edema  Skin exam: no open lesions, rashes, abrasions or ulcerations  Neurological exam: sensation to light touch intact in both lower extremities in peripheral and dermatomal distributions (except for any abnormalities noted in musculoskeletal exam)    Musculoskeletal exam:  Right lower extremity examination.  Patient pain continues to localize to the posterior tib tendon.  She has focal tenderness to palpation there.  Minimal tenderness to palpation about the distal medial malleolus, nontender to palpation at the lateral fibula.  Patient continues to lack approximately 20 degrees of ankle dorsiflexion that does not improve with knee flexion.  Patient has greater than physiologic hindfoot valgus with pes planus arch posture and no significant forefoot deformity noted.  Patient otherwise has supple and pain-free subtalar and midtarsal joint range of motion.  Patient has sensation intact light touch grossly to saphenous, sural, superficial peroneal, deep peroneal and tibial nerve distribution.  She has 5 out of 5 strength in plantarflexion, dorsiflexion and EHL.  She has 4- out of 5 strength in inversion and 5 out of 5 strength in eversion.  Patient has 2+ DP/PT pulse palpated.  She has a negative dorsiflexion external rotation stress test.  Negative Tinel's at the posterior tarsal tunnel.    Last Recorded Vitals  There were no vitals taken for this visit.    Laboratory Results    No results found for this or any previous visit (from the past 24 hour(s)).    Radiology Results   No new imaging at this visit.    Assessment/Plan:  43-year-old female who my impression has right medial malleoli are avulsion type injury in setting of progressively collapsing foot deformity with features of pes planus, acquired pronation deformity, PTTD and  Achilles contracture as well as left pes planus posture.  I have reviewed the diagnosis and treatment options extensively with the patient.  In my impression the patient may continue weightbearing as tolerated in her right lower extremity.  I again encouraged her to continue utilizing her lace up style ankle brace and to begin physical therapy.  I have recommended that she at least obtain a OTC orthotic and have counseled her again regarding the need for sturdy, supportive and accommodative footwear.  I will provide the patient with a handicap placard and will plan to see her back in approximately 2 months for repeat clinical evaluation.  I anticipate transitioning the patient back to full duty after completion of PT.  Upon return, patient would not require further imaging.    Rolando No MD, ARI  Department of Orthopaedic Surgery  [facility]    The diagnosis and treatment plan were reviewed with the patient. All questions were answered. The patient verbalized understanding of the treatment plan. There were no barriers to understanding identified.    Note dictated with [employer] software.  Completed without full type editing and sent to avoid delay.

## 2024-02-12 ENCOUNTER — APPOINTMENT (OUTPATIENT)
Dept: PHYSICAL THERAPY | Facility: CLINIC | Age: 44
End: 2024-02-12

## 2024-03-07 NOTE — PROGRESS NOTES
History of Present Illness   Patient presents today for evaluation of side: bilateral upper extremity pain.  She has mild pain on the left and more significant pain on the right.  The patient sustained an acute injury on  12/18/2023 .  She sustained a ground-level fall.  She was seen in the emergency room and given a cock up wrist brace.  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  The patient denies any loss of consciousness.  She also injured her right lower extremity..  The patient denies any current numbness or tingling.  The pain is sharp, acute in nature, better with rest worse with motion.    1/16/2024 Follow up  Pt continues to have bilateral wrist pain. This is worse on the right. She has been in a cast 12/26/2023 without resolution of symptoms. She continues to have pain over the Scaphoid and SL interval. She denies numbness or tingling.     No past medical history on file.    Medication Documentation Review Audit       Reviewed by Hellen Albrecht CMA (Medical Assistant) on 02/08/24 at 1533      Medication Order Taking? Sig Documenting Provider Last Dose Status   albuterol 90 mcg/actuation inhaler [id number] Yes 1 TO 2 INHALATIONS INHALATION EVERY 4 TO 6 HOURS AS NEEDED. Historical Provider, MD Taking Active   amLODIPine (Norvasc) 10 mg tablet [id number] Yes Take 1 tablet (10 mg) by mouth once daily in the evening. Historical Provider, MD Taking Active   atorvastatin (Lipitor) 20 mg tablet [id number] Yes Take 1 tablet (20 mg) by mouth once daily. Historical Provider, MD Taking Active   chlorthalidone (Hygroton) 25 mg tablet [id number] Yes Take 1 tablet (25 mg) by mouth once daily. Historical Provider, MD Taking Active   diclofenac sodium (Voltaren) 1 % gel gel [id number] Yes Apply 1 Application topically 4 times a day as needed (pain). Rolando No MD Taking Active   famotidine (Pepcid) 20 mg tablet [id number] Yes Take 1 tablet (20 mg) by mouth twice a day. Historical Provider, MD Taking Active    ibuprofen 600 mg tablet [id number] Yes Take 800 mg by mouth every 6 hours. Historical Provider, MD Taking Active   meloxicam (Mobic) 7.5 mg tablet [id number] Yes Take 1 tablet (7.5 mg) by mouth once daily. Will B Beucler, DO Taking Active   omeprazole (PriLOSEC) 40 mg DR capsule [id number] Yes Take 1 capsule (40 mg) by mouth once daily in the morning. Take before meals. Historical Provider, MD Taking Active   topiramate (Topamax) 50 mg tablet [id number] Yes 50 mg (1 pill) AM and 100 mg (2 pills) PM Historical Provider, MD Taking Active   valsartan (Diovan) 160 mg tablet [id number] Yes Take 1 tablet (160 mg) by mouth once daily. Historical Provider, MD Taking Active                    Allergies   Allergen Reactions    Carisoprodol Shortness of breath     Dizziness, passed out    Sulfa (Sulfonamide Antibiotics) Hives       Social History     Socioeconomic History    Marital status:      Spouse name: Not on file    Number of children: Not on file    Years of education: Not on file    Highest education level: Not on file   Occupational History    Not on file   Tobacco Use    Smoking status: Not on file    Smokeless tobacco: Not on file   Substance and Sexual Activity    Alcohol use: Not on file    Drug use: Not on file    Sexual activity: Not on file   Other Topics Concern    Not on file   Social History Narrative    Not on file     Social Determinants of Health     Financial Resource Strain: Not on file   Food Insecurity: Not on file   Transportation Needs: Not on file   Physical Activity: Not on file   Stress: Not on file   Social Connections: Not on file   Intimate Partner Violence: Not on file   Housing Stability: Not on file       Past Surgical History:   Procedure Laterality Date     SECTION, CLASSIC  2015     Section    HYSTEROSCOPY  2016    Hysteroscopy With Endometrial Ablation    OTHER SURGICAL HISTORY  2021    Arm surgery    TUBAL LIGATION  2015    Tubal Ligation           Review of Systems   GENERAL: Negative  GI: Negative  MUSCULOSKELETAL: See HPI  SKIN: Negative  NEURO:  Negative     Physical Exam:  side: bilateral upper extremity:  Skin healthy to gross inspection, no breakdown  Right below elbow cast removed.  Mild swelling over the right wrist.  No swelling to the left wrist.  No ecchymosis noted bilaterally.  Moderate tenderness to palpation over the SL interval on the right wrist.  Negative Phelan shift test.  Moderate tender over the anatomic snuffbox on the right.  Intact flexion and extension of 1st IP joint and finger abduction  Sensation intact to light touch medial / ulnar and radial nerve distribution   Good cap refill     Imaging  X-rays of bilateral wrist were taken and reviewed in office today.  These were compared to right wrist x-rays dated 12/18/2023  5 views of both wrist which include a PA, lateral, oblique, scaphoid view and a pencil  test demonstrate no fracture dislocation there is mild diastases of the SL interval bilaterally at slightly over 3 mm.     Assessment   Patient with an acute side: bilateral wrist sprain.     Plan:  Patient continues to have right wrist pain.  She continued to have pain after a period of bracing and then casting. At this time we will obtain an MRI for further evaluation of the right wrist for concerns of SL ligament injury. She will follow up afterwards.

## 2024-04-09 ENCOUNTER — APPOINTMENT (OUTPATIENT)
Dept: ORTHOPEDIC SURGERY | Facility: CLINIC | Age: 44
End: 2024-04-09
Payer: COMMERCIAL

## 2024-04-11 ENCOUNTER — OFFICE VISIT (OUTPATIENT)
Dept: ORTHOPEDIC SURGERY | Facility: CLINIC | Age: 44
End: 2024-04-11
Payer: COMMERCIAL

## 2024-04-11 DIAGNOSIS — M76.821 POSTERIOR TIBIAL TENDON DYSFUNCTION (PTTD) OF RIGHT LOWER EXTREMITY: ICD-10-CM

## 2024-04-11 DIAGNOSIS — S82.51XA AVULSION FRACTURE OF MEDIAL MALLEOLUS OF RIGHT TIBIA, CLOSED, INITIAL ENCOUNTER: Primary | ICD-10-CM

## 2024-04-11 PROCEDURE — 99212 OFFICE O/P EST SF 10 MIN: CPT | Performed by: STUDENT IN AN ORGANIZED HEALTH CARE EDUCATION/TRAINING PROGRAM

## 2024-04-11 ASSESSMENT — PAIN - FUNCTIONAL ASSESSMENT: PAIN_FUNCTIONAL_ASSESSMENT: 0-10

## 2024-04-11 ASSESSMENT — PAIN SCALES - GENERAL: PAINLEVEL_OUTOF10: 4

## 2024-04-11 ASSESSMENT — PAIN DESCRIPTION - DESCRIPTORS: DESCRIPTORS: ACHING;SORE;DULL

## 2024-04-11 NOTE — PROGRESS NOTES
ORTHOPAEDIC SURGERY OFFICE VISIT    Chief Complaint:  Right ankle pain    History Of Present Illness  Pia Mooney is a 43 y.o. female who presents for evaluation of right ankle pain.  Patient sustained a fall yesterday sustained a twisting injury to her ankle.  She has had severe, 7 out of 10 pain.  She was seen in the ER where x-ray imaging was obtained that was concerning for fracture and was made protected weightbearing with crutches and a boot.  She has been unable to stand on her foot.  She has not had an injury like this in the past.  Patient underwent by right bunion correction with DMTO (sis) and 2nd DMTO with weil with Dr. Viramontes from [facility] in 02/2023 by review of care everywhere.  Patient reports persistent pain in that her toes are deviating to the right.  Patient has been ambulating with an avoidance gait to avoid pressure on the inside of her foot.  She reports that she missed her last follow-up and has not rescheduled to be seen.     01/09/2024: Patient returns for follow-up of her right ankle pain.  Patient continues with 6 out of 10 pain.  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  Patient has not yet scheduled her physical therapy.  She has been wearing a brace at home.  Patient reports stiffness in her toes that is distinct from the contralateral side.  Patient reports she has had bilateral flatfeet for as long as she can remember without significant progression of her deformity.    02/08/2024: Patient returns for follow-up of her right ankle pain.  She has 4 out of 10 pain and has continued in a light-duty capacity.  She has recently had physical therapy approved and is planning to begin therapy in the coming weeks.  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  Patient is only wearing her brace at home but not throughout the day.  She has not had orthotics made at this time.    04/11/2024: Patient returns for follow-up of her right ankle pain, she  continues with 4 out of 10 pain in a light-duty capacity.  She has begun physical therapy and is making great strides in performing an HEP at home.  She is working on updating her shoe wear.  She denies new trauma.    Past Medical History  No past medical history on file.    Surgical History  Recent Surgeries in Orthopaedic Surgery            No cases to display             Social History  Social History     Socioeconomic History    Marital status:      Spouse name: Not on file    Number of children: Not on file    Years of education: Not on file    Highest education level: Not on file   Occupational History    Not on file   Tobacco Use    Smoking status: Not on file    Smokeless tobacco: Not on file   Substance and Sexual Activity    Alcohol use: Not on file    Drug use: Not on file    Sexual activity: Not on file   Other Topics Concern    Not on file   Social History Narrative    Not on file     Social Determinants of Health     Financial Resource Strain: Low Risk  (11/5/2023)    Received from [facility]    Overall Financial Resource Strain (CARDIA)     Difficulty of Paying Living Expenses: Not very hard   Food Insecurity: Food Insecurity Present (11/5/2023)    Received from [facility]    Hunger Vital Sign     Worried About Running Out of Food in the Last Year: Sometimes true     Ran Out of Food in the Last Year: Never true   Transportation Needs: No Transportation Needs (11/5/2023)    Received from [facility]    PRAPARE - Transportation     Lack of Transportation (Medical): No     Lack of Transportation (Non-Medical): No   Physical Activity: Insufficiently Active (11/5/2023)    Received from [facility]    Exercise Vital Sign     Days of Exercise per Week: 2 days     Minutes of Exercise per Session: 30 min   Stress: Stress Concern Present (11/5/2023)    Received from [facility]    [employer] [place] [employer] - Occupational Stress Questionnaire     Feeling of Stress  : To some extent   Social Connections: Moderately Integrated (11/5/2023)    Received from [facility]    Social Connection and Isolation Panel [NHANES]     Frequency of Communication with Friends and Family: More than three times a week     Frequency of Social Gatherings with Friends and Family: Once a week     Attends Judaism Services: More than 4 times per year     Active Member of Clubs or Organizations: Yes     Attends Club or Organization Meetings: More than 4 times per year     Marital Status:    Intimate Partner Violence: Not on file   Housing Stability: High Risk (11/5/2023)    Received from [facility]    Housing Stability Vital Sign     Unable to Pay for Housing in the Last Year: Yes     Number of Places Lived in the Last Year: 1     Unstable Housing in the Last Year: No       Family History  No family history on file.     Allergies  Allergies   Allergen Reactions    Carisoprodol Shortness of breath     Dizziness, passed out    Sulfa (Sulfonamide Antibiotics) Hives       Review of Systems  REVIEW OF SYSTEMS  Constitutional: no unplanned weight loss  Psychiatric: no suicidal ideation  ENT: no vision changes, no sinus problems  Pulmonary: no shortness of breath during office visit today  Lymphatic: no enlarged lymph nodes  Cardiovascular: no chest pain or shortness of breath during office visit today  Gastrointestinal: no stomach problems  Genitourinary: no dysuria   Skin: no rashes  Endocrine: no thyroid problems  Neurological: no headache, no numbness  Hematological: no easy bruising  Musculoskeletal: Right ankle pain    Physical Exam  PHYSICAL EXAMINATION  Constitutional Exam: well developed and well nourished  Psychiatric Exam: alert and oriented, appropriate mood and behavior  Eye Exam: EOMI  Pulmonary Exam: breathing non-labored, no apparent distress  Lymphatic exam: no appreciable lymphadenopathy in the lower extremities  Cardiovascular exam: RRR to peripheral palpation, DP pulses  2+, PT 2+, toes are pink with good capillary refill, no pitting edema  Skin exam: no open lesions, rashes, abrasions or ulcerations  Neurological exam: sensation to light touch intact in both lower extremities in peripheral and dermatomal distributions (except for any abnormalities noted in musculoskeletal exam)    Musculoskeletal exam: Right lower extremity examination.  Patient with focal tenderness and mild bogginess to palpation overlying the PTT.  She is minimally tender to palpation of the distal medial malleolus and nontender to palpation at the lateral fibula.  She continues to lack approximate 20 degrees of ankle dorsiflexion that does not improve with knee flexion.  She has greater than physiologic hindfoot valgus with pes planus arch posture and no significant forefoot deformity noted.  Patient otherwise has pain-free and supple subtalar midtarsal joint range of motion. Patient has sensation intact light touch grossly to saphenous, sural, superficial peroneal, deep peroneal and tibial nerve distribution.  She has 5 out of 5 strength in plantarflexion, dorsiflexion and EHL.  She has 4- out of 5 strength in inversion and 5 out of 5 strength in eversion.  Patient has 2+ DP/PT pulse palpated.  She has a negative dorsiflexion external rotation stress test.  Negative Tinel's at the posterior tarsal tunnel.    Last Recorded Vitals  There were no vitals taken for this visit.    Laboratory Results    No results found for this or any previous visit (from the past 24 hour(s)).    Radiology Results   No new imaging at this visit.    Assessment/Plan:  43-year-old female who my impression has right medial malleoli are avulsion type injury in setting of progressively collapsing foot deformity with features of pes planus, acquired pronation deformity, PTTD and Achilles contracture as well as left pes planus posture.  I have reviewed the diagnosis and treatment options extensively with the patient.  In my impression the  patient may continue weightbearing as tolerated in her right lower extremity.  The patient is obviously making great strides with physical therapy and I have encouraged her to continue with therapy.  She is updating her shoe wear and I have again discussed obtaining an OTC orthotic for arch support.  I will plan to see the patient back in approximately 6 weeks for a repeat clinical evaluation.  Upon return, patient would not require further imaging.    Rolando No MD, ARI  Department of Orthopaedic Surgery  [facility]    The diagnosis and treatment plan were reviewed with the patient. All questions were answered. The patient verbalized understanding of the treatment plan. There were no barriers to understanding identified.    Note dictated with [employer] software.  Completed without full type editing and sent to avoid delay.

## 2024-05-21 ENCOUNTER — TELEPHONE (OUTPATIENT)
Dept: ORTHOPEDIC SURGERY | Facility: HOSPITAL | Age: 44
End: 2024-05-21
Payer: MEDICAID

## 2024-05-21 NOTE — TELEPHONE ENCOUNTER
Copied from CRM #[id number]. Topic: Information Request - Doctor, Hospital, or Provider  >> May 21, 2024 11:12 AM Noemi BAPTISTE wrote:  Pt needs a call back

## 2024-05-21 NOTE — TELEPHONE ENCOUNTER
I contacted the patient via the number listed in the chart, [phone] as I had received notification the patient was requesting a call back.  The patient was actually trying to get a hold of Dr. Portillo's office.  She is scheduled for follow-up later this week.  She has been in physical therapy and has been doing more walking of late.  We will plan to check x-rays on her follow-up, 3 view weightbearing right ankle.    Rolando No MD, ARI  Department of Orthopaedic Surgery  [facility]

## 2024-05-23 ENCOUNTER — OFFICE VISIT (OUTPATIENT)
Dept: ORTHOPEDIC SURGERY | Facility: CLINIC | Age: 44
End: 2024-05-23
Payer: COMMERCIAL

## 2024-05-23 DIAGNOSIS — M19.071 OSTEOARTHRITIS OF RIGHT MIDFOOT: ICD-10-CM

## 2024-05-23 DIAGNOSIS — M21.42 BILATERAL PES PLANUS: ICD-10-CM

## 2024-05-23 DIAGNOSIS — S82.51XA AVULSION FRACTURE OF MEDIAL MALLEOLUS OF RIGHT TIBIA, CLOSED, INITIAL ENCOUNTER: Primary | ICD-10-CM

## 2024-05-23 DIAGNOSIS — M21.41 BILATERAL PES PLANUS: ICD-10-CM

## 2024-05-23 PROCEDURE — 99212 OFFICE O/P EST SF 10 MIN: CPT | Performed by: STUDENT IN AN ORGANIZED HEALTH CARE EDUCATION/TRAINING PROGRAM

## 2024-05-23 ASSESSMENT — PAIN DESCRIPTION - DESCRIPTORS: DESCRIPTORS: ACHING

## 2024-05-23 ASSESSMENT — PAIN SCALES - GENERAL: PAINLEVEL_OUTOF10: 5 - MODERATE PAIN

## 2024-05-23 ASSESSMENT — PAIN - FUNCTIONAL ASSESSMENT: PAIN_FUNCTIONAL_ASSESSMENT: 0-10

## 2024-05-23 NOTE — PROGRESS NOTES
ORTHOPAEDIC SURGERY OFFICE VISIT    Chief Complaint:  Right ankle pain    History Of Present Illness  Pia Mooney is a 43 y.o. female who presents for evaluation of right ankle pain.  Patient sustained a fall yesterday sustained a twisting injury to her ankle.  She has had severe, 7 out of 10 pain.  She was seen in the ER where x-ray imaging was obtained that was concerning for fracture and was made protected weightbearing with crutches and a boot.  She has been unable to stand on her foot.  She has not had an injury like this in the past.  Patient underwent by right bunion correction with DMTO (sis) and 2nd DMTO with weil with Dr. Viramontes from [facility] in 02/2023 by review of care everywhere.  Patient reports persistent pain in that her toes are deviating to the right.  Patient has been ambulating with an avoidance gait to avoid pressure on the inside of her foot.  She reports that she missed her last follow-up and has not rescheduled to be seen.     01/09/2024: Patient returns for follow-up of her right ankle pain.  Patient continues with 6 out of 10 pain.  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  Patient has not yet scheduled her physical therapy.  She has been wearing a brace at home.  Patient reports stiffness in her toes that is distinct from the contralateral side.  Patient reports she has had bilateral flatfeet for as long as she can remember without significant progression of her deformity.    02/08/2024: Patient returns for follow-up of her right ankle pain.  She has 4 out of 10 pain and has continued in a light-duty capacity.  She has recently had physical therapy approved and is planning to begin therapy in the coming weeks.  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  Patient is only wearing her brace at home but not throughout the day.  She has not had orthotics made at this time.    04/11/2024: Patient returns for follow-up of her right ankle pain, she  continues with 4 out of 10 pain in a light-duty capacity.  She has begun physical therapy and is making great strides in performing an HEP at home.  She is working on updating her shoe wear.  She denies new trauma.    05/23/2024: Patient returns for follow-up of her right ankle pain.  She continues with 5 out of 10 pain that is unchanged and worse with ambulation.  She continues to report weight gain and is currently undergoing a workup with endocrinology.  She is having difficulty with heel or toe walking and pain in her midfoot.    Past Medical History  No past medical history on file.    Surgical History  Recent Surgeries in Orthopaedic Surgery            No cases to display             Social History  Social History     Socioeconomic History    Marital status:      Spouse name: Not on file    Number of children: Not on file    Years of education: Not on file    Highest education level: Not on file   Occupational History    Not on file   Tobacco Use    Smoking status: Not on file    Smokeless tobacco: Not on file   Substance and Sexual Activity    Alcohol use: Not on file    Drug use: Not on file    Sexual activity: Not on file   Other Topics Concern    Not on file   Social History Narrative    Not on file     Social Determinants of Health     Financial Resource Strain: Low Risk  (11/5/2023)    Received from [facility]    Overall Financial Resource Strain (CARDIA)     Difficulty of Paying Living Expenses: Not very hard   Food Insecurity: Food Insecurity Present (11/5/2023)    Received from [facility]    Hunger Vital Sign     Worried About Running Out of Food in the Last Year: Sometimes true     Ran Out of Food in the Last Year: Never true   Transportation Needs: No Transportation Needs (11/5/2023)    Received from [facility]    PRAPARE - Transportation     Lack of Transportation (Medical): No     Lack of Transportation (Non-Medical): No   Physical Activity: Insufficiently Active  (11/5/2023)    Received from [facility]    Exercise Vital Sign     Days of Exercise per Week: 2 days     Minutes of Exercise per Session: 30 min   Stress: Stress Concern Present (11/5/2023)    Received from [facility]    [employer] [place] [employer] - Occupational Stress Questionnaire     Feeling of Stress : To some extent   Social Connections: Moderately Integrated (11/5/2023)    Received from [facility]    Social Connection and Isolation Panel [NHANES]     Frequency of Communication with Friends and Family: More than three times a week     Frequency of Social Gatherings with Friends and Family: Once a week     Attends Latter-day Services: More than 4 times per year     Active Member of Clubs or Organizations: Yes     Attends Club or Organization Meetings: More than 4 times per year     Marital Status:    Intimate Partner Violence: Not on file   Housing Stability: High Risk (11/5/2023)    Received from [facility]    Housing Stability Vital Sign     Unable to Pay for Housing in the Last Year: Yes     Number of Places Lived in the Last Year: 1     Unstable Housing in the Last Year: No       Family History  No family history on file.     Allergies  Allergies   Allergen Reactions    Carisoprodol Shortness of breath     Dizziness, passed out    Sulfa (Sulfonamide Antibiotics) Hives       Review of Systems  REVIEW OF SYSTEMS  Constitutional: no unplanned weight loss  Psychiatric: no suicidal ideation  ENT: no vision changes, no sinus problems  Pulmonary: no shortness of breath during office visit today  Lymphatic: no enlarged lymph nodes  Cardiovascular: no chest pain or shortness of breath during office visit today  Gastrointestinal: no stomach problems  Genitourinary: no dysuria   Skin: no rashes  Endocrine: no thyroid problems  Neurological: no headache, no numbness  Hematological: no easy bruising  Musculoskeletal: Right ankle pain    Physical Exam  PHYSICAL  EXAMINATION  Constitutional Exam: well developed and well nourished  Psychiatric Exam: alert and oriented, appropriate mood and behavior  Eye Exam: EOMI  Pulmonary Exam: breathing non-labored, no apparent distress  Lymphatic exam: no appreciable lymphadenopathy in the lower extremities  Cardiovascular exam: RRR to peripheral palpation, DP pulses 2+, PT 2+, toes are pink with good capillary refill, no pitting edema  Skin exam: no open lesions, rashes, abrasions or ulcerations  Neurological exam: sensation to light touch intact in both lower extremities in peripheral and dermatomal distributions (except for any abnormalities noted in musculoskeletal exam)    Musculoskeletal exam: Right lower extremity examination.  Patient continues with pain localized to the PTT, minimal tenderness to palpation at the distal medial malleolus.  Patient continues with greater than physiologic hindfoot valgus with pes planus arch posture and no significant forefoot deformity noted.  Patient has sensation intact light touch grossly in a saphenous, sural, superficial peroneal, deep peroneal and tibial nerve distribution.  She has 5-5 strength in plantarflexion, dorsiflexion and EHL.  She has 2+ DP/PT pulse palpated she has a negative dorsiflexion external rotation stress test as well as negative Tinel's at the posterior/anterior tarsal tunnel.    Left lower extremity examination.  Patient has obvious pes planus arch posture with greater than physiologic hindfoot valgus and no significant forefoot deformity noted.    Last Recorded Vitals  There were no vitals taken for this visit.    Laboratory Results    No results found for this or any previous visit (from the past 24 hour(s)).    Radiology Results   No new imaging at this visit.    Assessment/Plan:  43-year-old female who my impression has right medial malleoli are avulsion type injury in setting of progressively collapsing foot deformity with features of pes planus, acquired pronation  deformity, PTTD and Achilles contracture as well as left pes planus posture.  I have reviewed the diagnosis and treatment options extensively with the patient.  I will recommend the patient continue weightbearing to her tolerance in her bilateral lower extremities, patient is working to obtain more supportive footwear.  I have encouraged her to continue with her physical therapy and to limit impact activity so as to minimize forces through the midfoot.  I will provide her with a work release note I will plan to see the patient back in approximate 2 months to follow her clinical course closely.  Upon return, patient would require 3 view weightbearing bilateral feet.    Rolando No MD, ARI  Department of Orthopaedic Surgery  [facility]    The diagnosis and treatment plan were reviewed with the patient. All questions were answered. The patient verbalized understanding of the treatment plan. There were no barriers to understanding identified.    Note dictated with [employer] software.  Completed without full type editing and sent to avoid delay.

## 2024-05-23 NOTE — PATIENT INSTRUCTIONS
[facility]: [phone]    [facility]: [phone]    [facility]: [phone]    [facility]:  [phone]

## 2024-05-23 NOTE — LETTER
May 23, 2024     Patient: Pia Mooney   YOB: 1980   Date of Visit: 5/23/2024       To Whom It May Concern:    It is my medical opinion that Pia Mooney may return to full duty immediately with no restrictions.    If you have any questions or concerns, please don't hesitate to call.         Sincerely,        Rolando No MD    CC: No Recipients

## 2024-07-03 ENCOUNTER — HOSPITAL ENCOUNTER (OUTPATIENT)
Dept: RADIOLOGY | Facility: HOSPITAL | Age: 44
Discharge: HOME | End: 2024-07-03
Payer: MEDICAID

## 2024-07-03 DIAGNOSIS — S69.91XA WRIST INJURY, RIGHT, INITIAL ENCOUNTER: ICD-10-CM

## 2024-07-03 PROCEDURE — 73221 MRI JOINT UPR EXTREM W/O DYE: CPT | Mod: RT

## 2024-08-13 ENCOUNTER — CLINICAL SUPPORT (OUTPATIENT)
Dept: OBSTETRICS AND GYNECOLOGY | Facility: CLINIC | Age: 44
End: 2024-08-13
Payer: COMMERCIAL

## 2024-08-13 DIAGNOSIS — Z20.822 CONTACT WITH AND (SUSPECTED) EXPOSURE TO COVID-19: Primary | ICD-10-CM

## 2024-08-13 LAB — SARS-COV-2 RNA RESP QL NAA+PROBE: NOT DETECTED

## 2024-08-13 PROCEDURE — 99212 OFFICE O/P EST SF 10 MIN: CPT | Performed by: NURSE PRACTITIONER

## 2024-08-13 PROCEDURE — 87635 SARS-COV-2 COVID-19 AMP PRB: CPT

## 2025-04-09 ENCOUNTER — APPOINTMENT (OUTPATIENT)
Dept: OBSTETRICS AND GYNECOLOGY | Facility: CLINIC | Age: 45
End: 2025-04-09
Payer: MEDICAID

## 2025-05-19 DIAGNOSIS — B37.2 YEAST DERMATITIS: Primary | ICD-10-CM

## 2025-05-19 RX ORDER — FLUCONAZOLE 150 MG/1
150 TABLET ORAL ONCE
Qty: 2 TABLET | Refills: 1 | Status: SHIPPED | OUTPATIENT
Start: 2025-05-19 | End: 2025-05-19

## 2025-06-23 ENCOUNTER — SPECIALTY PHARMACY (OUTPATIENT)
Dept: PHARMACY | Facility: CLINIC | Age: 45
End: 2025-06-23

## 2025-06-30 ENCOUNTER — SPECIALTY PHARMACY (OUTPATIENT)
Dept: PHARMACY | Facility: CLINIC | Age: 45
End: 2025-06-30

## 2025-07-14 ENCOUNTER — SPECIALTY PHARMACY (OUTPATIENT)
Dept: PHARMACY | Facility: CLINIC | Age: 45
End: 2025-07-14

## 2025-08-05 ENCOUNTER — OFFICE VISIT (OUTPATIENT)
Dept: OBSTETRICS AND GYNECOLOGY | Facility: CLINIC | Age: 45
End: 2025-08-05
Payer: COMMERCIAL

## 2025-08-05 VITALS
HEIGHT: 65 IN | BODY MASS INDEX: 43.62 KG/M2 | DIASTOLIC BLOOD PRESSURE: 126 MMHG | HEART RATE: 99 BPM | SYSTOLIC BLOOD PRESSURE: 170 MMHG | WEIGHT: 261.8 LBS

## 2025-08-05 DIAGNOSIS — N92.0 MENORRHAGIA WITH REGULAR CYCLE: ICD-10-CM

## 2025-08-05 DIAGNOSIS — N39.46 MIXED STRESS AND URGE URINARY INCONTINENCE: Primary | ICD-10-CM

## 2025-08-05 LAB
POC APPEARANCE, URINE: CLEAR
POC BILIRUBIN, URINE: NEGATIVE
POC BLOOD, URINE: NEGATIVE
POC COLOR, URINE: YELLOW
POC GLUCOSE, URINE: NEGATIVE MG/DL
POC KETONES, URINE: NEGATIVE MG/DL
POC LEUKOCYTES, URINE: NEGATIVE
POC NITRITE,URINE: NEGATIVE
POC PH, URINE: 6 PH
POC PROTEIN, URINE: ABNORMAL MG/DL
POC SPECIFIC GRAVITY, URINE: 1.02
POC UROBILINOGEN, URINE: 0.2 EU/DL

## 2025-08-05 PROCEDURE — 3008F BODY MASS INDEX DOCD: CPT | Performed by: NURSE PRACTITIONER

## 2025-08-05 PROCEDURE — 81003 URINALYSIS AUTO W/O SCOPE: CPT | Performed by: NURSE PRACTITIONER

## 2025-08-05 PROCEDURE — 99212 OFFICE O/P EST SF 10 MIN: CPT | Performed by: NURSE PRACTITIONER

## 2025-08-05 PROCEDURE — 99202 OFFICE O/P NEW SF 15 MIN: CPT | Performed by: NURSE PRACTITIONER

## 2025-08-05 ASSESSMENT — ENCOUNTER SYMPTOMS
EYES NEGATIVE: 1
LOSS OF SENSATION IN FEET: 0
PSYCHIATRIC NEGATIVE: 1
ENDOCRINE NEGATIVE: 1
GASTROINTESTINAL NEGATIVE: 1
RESPIRATORY NEGATIVE: 1
CARDIOVASCULAR NEGATIVE: 1
ALLERGIC/IMMUNOLOGIC NEGATIVE: 1
MUSCULOSKELETAL NEGATIVE: 1
HEMATOLOGIC/LYMPHATIC NEGATIVE: 1
OCCASIONAL FEELINGS OF UNSTEADINESS: 0
DEPRESSION: 0
CONSTITUTIONAL NEGATIVE: 1

## 2025-08-05 ASSESSMENT — PAIN SCALES - GENERAL: PAINLEVEL_OUTOF10: 0-NO PAIN

## 2025-08-05 NOTE — PROGRESS NOTES
Referring Physician: No ref. provider found     General medical background: Patient had hospitalization for R transverse-sigmoid sinus stenting  Date of hospitalization: 2024    Obstetric/Gynecologic History:  Pia Mooney has a history of : 3. Para: 3. There is a history of 1  sections. Patient currently not sexually active.     Past Evaluation and Treatment::  Past evaluation includes: This problem has not been previously evaluated  Past treatment includes: This problem has not been previously treated    Review of Systems   Constitutional: Negative.    HENT: Negative.     Eyes: Negative.    Respiratory: Negative.     Cardiovascular: Negative.    Gastrointestinal: Negative.    Endocrine: Negative.    Genitourinary:  Positive for urgency.   Musculoskeletal: Negative.    Skin: Negative.    Allergic/Immunologic: Negative.    Hematological: Negative.    Psychiatric/Behavioral: Negative.          44 y.o. female with c/o urinary incontinence presents as a new patient. Reports both urgency and stress incontinence for a while. Associates the onset of urge incontinence with an emergency . She noted worsening of stress incontinence after brain surgery, with episodes of incontinence occurring during vomiting and sneezing. Uses panty liners daily, changes them 1-2 times per day. Has not tried OAB meds or PFPT in the past. Considers UDS to evaluate bladder function. Continues to menstruate, experiences dysmenorrhea and heavy periods. Hx of ablation in , which did not completely resolve the heavy bleeding. Considers having another ablation, or using IUD to manage bleeding. Concerned about IUD use due to potential migration issues. Hx of IIH. Hx of high BP, which was exacerbated by use of NuvaRing in the past. FHx of strokes, concerned about estrogen use. Currently not sexually active. LMP was approximately 2 weeks ago. Mentions having pelvic pain for years, which she finds manageable and does  not seek intervention at this time.         Urinary Incontinence Questions:  Looking back, how long do you think that you have been affected by your urinary complaints? Several years  Have you seen a physician or medical provider for this? No  Have you ever been prescribed any medication for this? No  Have you ever taken any medication for this? No  How many different medications have you tried or been prescribed? 0  How frustrated are you with your bladder symptoms? Moderately frustrated  What are your goals of therapy? Goal 1: UDS    Testing results:  PVR results are available and reviewed  : 0 ml  UA results available and reviewed  Laboratory:   Urine dipstick shows negative.      History:  Stress Symptoms:   Does sneezing cause you to lose urine? 2 - Sometimes  Score:  Urge Symptoms:   Some women receive very little warning and suddenly find that they are losing, or about to lose, urine beyond their control. How often does this happen to you? 2 - Sometimes  Score:      Physical Exam  Constitutional:       Appearance: Normal appearance. She is obese.   Genitourinary:      Vulva, bladder and urethral meatus normal.      Genitourinary Comments: Stage 2 prolapse. Anterior and posterior POP-Q measurements observed with straining.       Anterior vaginal prolapse present.     Vaginal exam comments: Normal healthy tissues with normal discharge.        Right Adnexa: no mass present.     Left Adnexa: no mass present.     No cervical motion tenderness.      Uterus is not enlarged or fixed.      Urethral hypermobility present.      No urethral stress urinary incontinence with cough stress test present.      Pelvic Floor: Levator muscle strength is 3/5.     Levator ani is tender.      Pelvic Floor comments: Diffusely tight and tender pelvic floor.  POP-Q measurements were:      Aa: -1, Ba: -1, C: -8     gH: 4, pB: TVL:      Ap: -1, Bp: -1, D:     Pelvic exam was performed with patient in the lithotomy position.   HENT:       Head: Normocephalic and atraumatic.   Abdominal:      Tenderness: There is abdominal tenderness.     Neurological:      General: No focal deficit present.      Mental Status: She is alert and oriented to person, place, and time.     Psychiatric:         Mood and Affect: Mood normal.         Behavior: Behavior normal.         Thought Content: Thought content normal.         Judgment: Judgment normal.          Assessment and Plan  44 y.o. female with mixed stress and urge urinary incontinence.     Diagnosis List:  #1 Mixed stress and urge urinary incontinence    Plan:  1. Mixed stress and urge urinary incontinence  - POCT UA automated manually resulted.  - Post void residual measured.   - We discussed that in order to correctly treat her urinary issues we recommend pursuing UDS to fully assess/diagnose the type of urinary problems she is having (i.e., PERLITA vs. ISD vs. OAB). Urodynamics assesses bladder function and capacity, voiding/filling patterns, urethral strength, assess if there is GAR, MUCP, etc.  - Ordered urodynamics to better characterize her voiding/filling patterns and assess her overall bladder functioning to further characterize her urinary issues.    - Discussed consideration for IUD for management of heavy menstrual bleeding, considering concerns about estrogen use due to high BP and stroke risk. Consider evaluating for the need for a repeat endometrial ablation or hysterectomy, considering the patient's desire to avoid future cycles and the potential for prolapse.        Follow up with BOOKER Riley after receipt of UDS results.       Scribe Attestation  By signing my name below, IKrissy Scribe, attest that this documentation has been prepared under the direction and in the presence of BOOKER Riley on 08/05/2025 at 1:49 PM.     SPEKE audio duration: 20 minutes    I spent a total of 28 minutes in face to face and non face to face time.   BOOKER Riley    I, TOYIN Riley-ASTON, personally performed the services described in this documentation which was scribed virtually and I confirm that it is both accurate and complete.

## 2025-08-06 ENCOUNTER — APPOINTMENT (OUTPATIENT)
Dept: OBSTETRICS AND GYNECOLOGY | Facility: CLINIC | Age: 45
End: 2025-08-06
Payer: COMMERCIAL

## 2025-08-27 ENCOUNTER — APPOINTMENT (OUTPATIENT)
Dept: PRIMARY CARE | Facility: CLINIC | Age: 45
End: 2025-08-27
Payer: COMMERCIAL

## 2025-08-27 DIAGNOSIS — B37.2 YEAST INFECTION OF THE SKIN: Primary | ICD-10-CM

## 2025-08-27 RX ORDER — NYSTATIN 100000 U/G
CREAM TOPICAL 2 TIMES DAILY
Qty: 30 G | Refills: 2 | Status: SHIPPED | OUTPATIENT
Start: 2025-08-27 | End: 2026-08-27

## 2025-08-27 RX ORDER — FLUCONAZOLE 150 MG/1
150 TABLET ORAL ONCE
Qty: 2 TABLET | Refills: 1 | Status: SHIPPED | OUTPATIENT
Start: 2025-08-27 | End: 2025-08-27

## 2025-09-30 ENCOUNTER — APPOINTMENT (OUTPATIENT)
Dept: PRIMARY CARE | Facility: CLINIC | Age: 45
End: 2025-09-30
Payer: COMMERCIAL

## 2025-11-03 ENCOUNTER — APPOINTMENT (OUTPATIENT)
Dept: PRIMARY CARE | Facility: CLINIC | Age: 45
End: 2025-11-03
Payer: COMMERCIAL